# Patient Record
Sex: FEMALE | Race: WHITE
[De-identification: names, ages, dates, MRNs, and addresses within clinical notes are randomized per-mention and may not be internally consistent; named-entity substitution may affect disease eponyms.]

---
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
## 2017-07-21 ENCOUNTER — HOSPITAL ENCOUNTER (OUTPATIENT)
Dept: HOSPITAL 33 - SDC | Age: 37
Discharge: HOME | End: 2017-07-21
Attending: FAMILY MEDICINE
Payer: COMMERCIAL

## 2017-07-21 VITALS — DIASTOLIC BLOOD PRESSURE: 74 MMHG | OXYGEN SATURATION: 99 % | SYSTOLIC BLOOD PRESSURE: 116 MMHG

## 2017-07-21 VITALS — HEART RATE: 71 BPM

## 2017-07-21 DIAGNOSIS — K29.70: ICD-10-CM

## 2017-07-21 DIAGNOSIS — K30: Primary | ICD-10-CM

## 2017-07-21 PROCEDURE — 88305 TISSUE EXAM BY PATHOLOGIST: CPT

## 2017-07-21 PROCEDURE — 00740: CPT

## 2017-07-21 PROCEDURE — 36415 COLL VENOUS BLD VENIPUNCTURE: CPT

## 2017-07-21 PROCEDURE — 0DB78ZX EXCISION OF STOMACH, PYLORUS, VIA NATURAL OR ARTIFICIAL OPENING ENDOSCOPIC, DIAGNOSTIC: ICD-10-PCS | Performed by: FAMILY MEDICINE

## 2017-07-21 NOTE — OP
SURGERY DATE/TIME:  07/21/2017  0745     



PREOPERATIVE DIAGNOSIS:    Epigastric pain.



POSTOPERATIVE DIAGNOSES:    

1) Delayed gastric emptying. 

2) Mild gastritis. 



PROCEDURE:    Esophagogastroduodenoscopy with biopsy.



SURGEON:    Dr. Olivier.



ANESTHESIA:    Medications were given by the anesthesia department. 



BRIEF HISTORY: The patient is a 37 year old white female who presents now with persistent 
epigastric pain. She reports she has been having the pain for a year and a half. She 
reports that she had a previous endoscopy which showed antral gastritis. The patient was 
felt the need to have repeat endoscopic evaluation given the persistence of the pain. The 
patient was appraised of the risks of the procedure including the risk of perforation, 
phlebitis, untoward reaction to medication, bleeding and missed lesions.  The patient 
verbalized her understanding and desired to have the procedure performed.



DESCRIPTION OF PROCEDURE:    The patient was given the medications by the anesthesia 
department.  She had continuous pulse oximetry, ECG monitoring, intermittent blood 
pressure monitoring and tidal CO2 monitoring during the examination. She was placed in the 
left lateral decubitus position.  A bite block was placed. The flexible Olympus 
gastroscope was used to intubate the oropharynx. A view of the larynx was obtained and was 
normal. The scope was easily passed in the esophagus which appeared to be normal 
throughout its length. The stomach was entered. There was noted to be fairly large amounts 
of retained gastric food stuff but we were able to examine the stomach however with the 
exception the part underlying the gastric food stuffs. The scope was passed along the 
greater curvature of the stomach to the antrum. The pylorus encountered and intubated. 
Duodenum inspected and found to be normal. The scope is withdrawn towards the stomach. 
Again, a retroflex view was obtained and showed normal appearing cardia region and fundus 
of the stomach. The scope was then redirected towards the gastric antrum and biopsies were 
obtained to rule out the presence of Helicobacter pylori-type organisms. The scope was 
then removed from the patient who tolerated the procedure well and was sent back to 
outpatient recovery in good condition.

## 2018-05-26 ENCOUNTER — HOSPITAL ENCOUNTER (EMERGENCY)
Dept: HOSPITAL 33 - ED | Age: 38
Discharge: ANOTHER HOSPITAL | End: 2018-05-26
Payer: COMMERCIAL

## 2018-05-26 VITALS — OXYGEN SATURATION: 98 %

## 2018-05-26 VITALS — HEART RATE: 51 BPM | SYSTOLIC BLOOD PRESSURE: 129 MMHG | DIASTOLIC BLOOD PRESSURE: 70 MMHG

## 2018-05-26 DIAGNOSIS — R19.7: ICD-10-CM

## 2018-05-26 DIAGNOSIS — R10.12: ICD-10-CM

## 2018-05-26 DIAGNOSIS — R11.2: ICD-10-CM

## 2018-05-26 DIAGNOSIS — R94.5: Primary | ICD-10-CM

## 2018-05-26 DIAGNOSIS — Z79.899: ICD-10-CM

## 2018-05-26 LAB
ALBUMIN SERPL-MCNC: 4.4 G/DL (ref 3.5–5)
ALP SERPL-CCNC: 194 U/L (ref 38–126)
ALT SERPL-CCNC: 235 U/L (ref 0–35)
AMPHETAMINES UR QL: NEGATIVE
AMYLASE SERPL-CCNC: 54 U/L (ref 30–110)
ANION GAP SERPL CALC-SCNC: 15.2 MEQ/L (ref 5–15)
AST SERPL QL: 168 U/L (ref 14–36)
BARBITURATES UR QL: NEGATIVE
BENZODIAZ UR QL SCN: POSITIVE
BILIRUB BLD-MCNC: 2.4 MG/DL (ref 0.2–1.3)
BUN SERPL-MCNC: 4 MG/DL (ref 7–17)
CALCIUM SPEC-MCNC: 9.3 MG/DL (ref 8.4–10.2)
CELLS COUNTED: 100
CHLORIDE SERPL-SCNC: 106 MMOL/L (ref 98–107)
CO2 SERPL-SCNC: 20 MMOL/L (ref 22–30)
COCAINE UR QL SCN: NEGATIVE
CREAT SERPL-MCNC: 0.53 MG/DL (ref 0.52–1.04)
GLUCOSE SERPL-MCNC: 100 MG/DL (ref 74–106)
GLUCOSE UR-MCNC: NEGATIVE MG/DL
GRANULOCYTES # BLD AUTO: 4.15 10*3/UL (ref 1.4–6.9)
HCT VFR BLD AUTO: 46.6 % (ref 35–47)
HGB BLD-MCNC: 16.3 GM/DL (ref 12–16)
LIPASE SERPL-CCNC: 62 U/L (ref 23–300)
MANUAL DIF COMMENT BLD-IMP: NORMAL
MCH RBC QN AUTO: 35.7 PG (ref 26–32)
MCHC RBC AUTO-ENTMCNC: 35 G/DL (ref 32–36)
METHADONE UR QL: NEGATIVE
OPIATES UR QL: POSITIVE
PCP UR QL CFM>20 NG/ML: NEGATIVE
PLATELET # BLD AUTO: 149 K/MM3 (ref 150–450)
POTASSIUM SERPLBLD-SCNC: 3.3 MMOL/L (ref 3.5–5.1)
PROT SERPL-MCNC: 7.5 G/DL (ref 6.3–8.2)
PROT UR STRIP-MCNC: NEGATIVE MG/DL
RBC # BLD AUTO: 4.56 M/MM3 (ref 4.1–5.4)
SODIUM SERPL-SCNC: 139 MMOL/L (ref 137–145)
THC UR QL SCN: POSITIVE
WBC # BLD AUTO: 8.4 K/MM3 (ref 4–10.5)

## 2018-05-26 PROCEDURE — 99285 EMERGENCY DEPT VISIT HI MDM: CPT

## 2018-05-26 PROCEDURE — 96375 TX/PRO/DX INJ NEW DRUG ADDON: CPT

## 2018-05-26 PROCEDURE — 84703 CHORIONIC GONADOTROPIN ASSAY: CPT

## 2018-05-26 PROCEDURE — 82150 ASSAY OF AMYLASE: CPT

## 2018-05-26 PROCEDURE — 81002 URINALYSIS NONAUTO W/O SCOPE: CPT

## 2018-05-26 PROCEDURE — 83690 ASSAY OF LIPASE: CPT

## 2018-05-26 PROCEDURE — 74176 CT ABD & PELVIS W/O CONTRAST: CPT

## 2018-05-26 PROCEDURE — 80307 DRUG TEST PRSMV CHEM ANLYZR: CPT

## 2018-05-26 PROCEDURE — 85025 COMPLETE CBC W/AUTO DIFF WBC: CPT

## 2018-05-26 PROCEDURE — 96361 HYDRATE IV INFUSION ADD-ON: CPT

## 2018-05-26 PROCEDURE — 36000 PLACE NEEDLE IN VEIN: CPT

## 2018-05-26 PROCEDURE — 36415 COLL VENOUS BLD VENIPUNCTURE: CPT

## 2018-05-26 PROCEDURE — 96360 HYDRATION IV INFUSION INIT: CPT

## 2018-05-26 PROCEDURE — 80053 COMPREHEN METABOLIC PANEL: CPT

## 2018-05-26 PROCEDURE — 96374 THER/PROPH/DIAG INJ IV PUSH: CPT

## 2018-05-26 NOTE — XRAY
Indication: Lower abdominal pain.  Nausea, vomiting, diarrhea.



Multiple contiguous axial images obtained through the abdomen and pelvis

without contrast as ordered.



Comparison: January 30, 2013.



Lung bases again demonstrates minimal right middle lobe fibrosis/scarring.  No

infiltrate or effusion.  Heart is not enlarged.



Noncontrasted stomach and bowel loops appear nonobstructed.  Normal appendix.

Minimal sigmoid diverticulosis without diverticulitis.  No free fluid/air.

Again diffuse fatty liver, cholecystectomy, and hysterectomy.



Remaining liver, pancreas, spleen, adrenal glands, kidneys, ureters, bladder,

and aorta appear unremarkable for noncontrast exam.



Osseous structures intact.



Impression:

1.  Again fatty liver and sigmoid diverticulosis.

2.  No new or acute intra-abdominal/pelvic abnormalities on this noncontrast

exam.



Comment: Preliminary interpretation was made by [facility].  No critical discrepancy.



CTDI 18.18

## 2018-05-26 NOTE — ERPHSYRPT
- History of Present Illness


Time Seen by Provider: 05/26/18 00:23


Historian: patient


Exam Limitations: no limitations


Patient Subjective Stated Complaint: n/v/d since tuesday.  low abdominal pain 

that radiates to back and upper left abdomen.  has a hx of pancreatitis.  

states has hx of stomache problems.  ramos not followed up with Dr Ferrell.


Triage Nursing Assessment: alert and uncomfortable. states n/v/d since tuesday.

  states pain is lower abdomen that goes to back and upper left abdomen.  

states unable to eat since tuedsay and had water earlier.  manisha has had 

difficulty with urinating but denies dysuria.  + BS x4.  color pale.  states 

her diarrhea has been liquid.  was scheduled for colonoscopy but has not 

followed up.  manisha has a hx of pancreatitis.


Physician History: 





Pt started c/o LUQ abdominal pain, nausea, vomiting, and diarrhea 3 days ago. 

She has vomited x5 today, denies vomiting blood, and bloody, black diarrhea, no 

fever, chills, urinary complaints. 


Timing/Duration: day(s) (3)


Activities at Onset: none


Quality: cramping


Abdominal Pain Onset Location: LUQ


Pain Radiation: flank (left)


Severity of Pain-Max: severe


Severity of Pain-Current: severe


Modifying Factors: Improves With: nothing


Associated Symptoms: diarrhea, nausea, vomiting


Previous symptoms: same symptoms as today


Allergies/Adverse Reactions: 








Iodinated Contrast- Oral and IV Dye [IV Dye, Iodine Containing Contrast ] 

Allergy (Intermediate, Verified 07/21/17 07:25)


 Swelling





Home Medications: 








Melatonin/Pyridoxine HCl (B6) [Melatonin 1 mg Tablet] 1 mg PO DAILY 10/09/16 [

History]


Albuterol Sulfate [Ventolin Hfa] 8 gm IH Q4-6HPRN PRN 07/20/17 [History]


Amitriptyline HCl 100 mg PO HS 07/20/17 [History]





Hx Tetanus, Diphtheria Vaccination/Date Given: Yes


Hx Influenza Vaccination/Date Given: No


Hx Pneumococcal Vaccination/Date Given: No


Immunizations Up to Date:  (unknown)





- Review of Systems


Constitutional: No Symptoms


Abdominal/Gastrointestinal: Abdominal Pain, Nausea, Vomiting, Diarrhea


Genitourinary Symptoms: Flank Pain


All Other Systems: Reviewed and Negative





- Past Medical History


Pertinent Past Medical History: Yes


Neurological History: Migraines


ENT History: No Pertinent History


Cardiac History: No Pertinent History


Respiratory History: No Pertinent History


Endocrine Medical History: No Pertinent History


Musculoskeletal History: Osteoarthritis


GI Medical History: Gallbladder Disease


 History: No Pertinent History


Psycho-Social History: Depression


Female Reproductive Disorders: Uterine Cancer


Other Medical History: BACK PROBLEMS





- Past Surgical History


Past Surgical History: Yes


Neuro Surgical History: No Pertinent History


Cardiac: No Pertinent History


Respiratory: No Pertinent History


Gastrointestinal: Cholecystectomy


Musculoskeletal: Orthopedic Surgery


Female Surgical History: Hysterectomy


Other Surgical History: neck surg





- Social History


Smoking Status: Current every day smoker


How long have you smoked: 25


Exposure to second hand smoke: Yes


Drug Use: marijuana


Patient Lives Alone: No





- Female History


Hx Pregnant Now: No





- Nursing Vital Signs


Nursing Vital Signs: 


 Initial Vital Signs











Temperature  98.1 F   05/26/18 00:13


 


Pulse Rate  56 L  05/26/18 00:13


 


Respiratory Rate  20   05/26/18 00:13


 


Blood Pressure  127/89   05/26/18 00:13


 


O2 Sat by Pulse Oximetry  98   05/26/18 00:13








 Pain Scale











Pain Intensity                 9

















- Physical Exam


General Appearance: no apparent distress


Eye Exam: eyes nml inspection


Ears, Nose, Throat Exam: normal ENT inspection


Neck Exam: normal inspection, non-tender, supple


Respiratory Exam: normal breath sounds, lungs clear, airway intact, No chest 

tenderness


Cardiovascular Exam: regular rate/rhythm, normal heart sounds, normal 

peripheral pulses, No murmur


Gastrointestinal/Abdomen Exam: soft, normal bowel sounds, tenderness (LUQ, mod.)

, No distention, No mass, No guarding, No pulsatile mass, No rebound, No 

organomegaly


Pelvic Exam: not done


Back Exam: normal inspection, CVA tenderness (bilateral)


Extremity Exam: normal inspection


Neurologic Exam: alert, oriented x 3, normal mood/affect


Skin Exam: normal color, warm, dry, No rash


Lymphatic Exam: No adenopathy


**SpO2 Interpretation**: normal


SpO2: 98


Oxygen Delivery: Room Air





- Course


Nursing assessment & vital signs reviewed: Yes





- CT Exams


  ** Abdomen/Pelvis


CT Interpretation: Negative, Tele-radiologist Report


Ordered Tests: 


 Active Orders 24 hr











 Category Date Time Status


 


 IV Insertion STAT Care  05/26/18 00:46 Active


 


 ABDOMEN AND PELVIS W/0 CONTRAS [CT] Stat Exams  05/26/18 00:46 Taken


 


 AMYLASE Stat Lab  05/26/18 00:50 Completed


 


 CBC W DIFF Stat Lab  05/26/18 00:50 Completed


 


 CMP Stat Lab  05/26/18 00:50 Completed


 


 HCG,QUALITATIVE URINE Stat Lab  05/26/18 00:50 Completed


 


 LIPASE Stat Lab  05/26/18 00:50 Completed


 


 Manual Differential NC Stat Lab  05/26/18 00:50 Completed


 


 UA W/RFX UR CULTURE Stat Lab  05/26/18 00:50 Completed


 


 Urine Triage Profile Stat Lab  05/26/18 00:50 Completed








Medication Summary














Discontinued Medications














Generic Name Dose Route Start Last Admin





  Trade Name Monica  PRN Reason Stop Dose Admin


 


Famotidine  20 mg  05/26/18 00:46  05/26/18 01:02





  Pepcid 20 Mg Vial***  IV  05/26/18 00:47  20 mg





  STAT ONE   Administration


 


Famotidine  Confirm  05/26/18 00:53  





  Pepcid 20 Mg Vial***  Administered  05/26/18 00:54  





  Dose   





  20 mg   





  IV   





  .STK-MED ONE   


 


Fentanyl Citrate  75 mcg  05/26/18 03:30  





  Sublimaze 100 Mcg/2 Ml***  IV  05/26/18 03:31  





  STAT ONE   


 


Sodium Chloride  1,000 mls @ 999 mls/hr  05/26/18 00:46  05/26/18 01:02





  Sodium Chloride 0.9% 1000 Ml  IV  05/26/18 01:46  999 mls/hr





  .Q1H1M STA   Administration


 


Sodium Chloride  Confirm  05/26/18 00:54  





  Sodium Chloride 0.9% 1000 Ml  Administered  05/26/18 00:55  





  Dose   





  1,000 mls @ ud   





  .ROUTE   





  .STK-MED ONE   


 


Ketorolac Tromethamine  30 mg  05/26/18 00:46  05/26/18 01:02





  Toradol 30 Mg Injection***  IV  05/26/18 00:47  30 mg





  STAT ONE   Administration


 


Ketorolac Tromethamine  Confirm  05/26/18 00:53  





  Toradol 30 Mg Injection***  Administered  05/26/18 00:54  





  Dose   





  30 mg   





  .ROUTE   





  .STK-MED ONE   


 


Ondansetron HCl  4 mg  05/26/18 00:46  05/26/18 01:02





  Zofran 4 Mg/2 Ml Vial**  IV  05/26/18 00:47  4 mg





  STAT ONE   Administration


 


Ondansetron HCl  Confirm  05/26/18 00:53  





  Zofran 4 Mg/2 Ml Vial**  Administered  05/26/18 00:54  





  Dose   





  4 mg   





  .ROUTE   





  .STK-MED ONE   











Lab/Rad Data: 


 Laboratory Result Diagrams





 05/26/18 00:50 





 05/26/18 00:50 





 Laboratory Results











  05/26/18 05/26/18 05/26/18 Range/Units





  00:50 00:50 00:50 


 


WBC     (4.0-10.5)  K/mm3


 


RBC     (4.1-5.4)  M/mm3


 


Hgb     (12.0-16.0)  gm/dl


 


Hct     (35-47)  %


 


MCV     ()  fl


 


MCH     (26-32)  pg


 


MCHC     (32-36)  g/dl


 


RDW     (11.5-14.0)  %


 


Plt Count     (150-450)  K/mm3


 


MPV     (6-9.5)  fl


 


Absolute Granulocytes     (1.4-6.9)  


 


Segmented Neutrophils     (36.0-66.0)  %


 


Lymphocytes (Manual)     (24-44)  %


 


Monocytes (Manual)     (0.0-12.0)  %


 


Eosinophils (Manual)     (0.00-3.0)  %


 


Basophils (Manual)     (0.0-1.0)  %


 


Platelet Estimate     (NORMAL)  


 


RBC Morphology     


 


Sodium     (137-145)  mmol/L


 


Potassium     (3.5-5.1)  mmol/L


 


Chloride     ()  mmol/L


 


Carbon Dioxide     (22-30)  mmol/L


 


Anion Gap     (5-15)  MEQ/L


 


BUN     (7-17)  mg/dL


 


Creatinine     (0.52-1.04)  mg/dL


 


Estimated GFR     ML/MIN


 


Glucose     ()  mg/dL


 


Calcium     (8.4-10.2)  mg/dL


 


Total Bilirubin     (0.2-1.3)  mg/dL


 


AST     (14-36)  U/L


 


ALT     (0-35)  U/L


 


Alkaline Phosphatase     ()  U/L


 


Serum Total Protein     (6.3-8.2)  g/dL


 


Albumin     (3.5-5.0)  g/dL


 


Amylase     ()  U/L


 


Lipase     ()  U/L


 


Ur Collection Type    CLEAN CATCH  


 


Urine Color    GABY  (YELLOW)  


 


Urine Appearance    CLEAR  (CLEAR)  


 


Urine pH    6.0  (5-6)  


 


Ur Specific Gravity    1.015  (1.005-1.025)  


 


Urine Protein    NEGATIVE  (Negative)  


 


Urine Ketones    MODERATE  (NEGATIVE)  


 


Urine Blood    NEGATIVE  (0-5)  Oscar/ul


 


Urine Nitrite    NEGATIVE  (NEGATIVE)  


 


Urine Bilirubin    NEGATIVE  (NEGATIVE)  


 


Urine Urobilinogen    NORMAL  (0-1)  mg/dL


 


Ur Leukocyte Esterase    NEGATIVE  (NEGATIVE)  


 


Urine Culture Reflexed    NO  (NO)  


 


Urine Glucose    NEGATIVE  (NEGATIVE)  mg/dL


 


Urine HCG, Qual  NEGATIVE    (Negative)  


 


Urine Opiates Level   POSITIVE   (NEGATIVE)  


 


Ur Methadone   NEGATIVE   (NEGATIVE)  


 


Urine Barbiturates   NEGATIVE   (NEGATIVE)  


 


Ur Phencyclidine (PCP)   NEGATIVE   (NEGATIVE)  


 


Urine Amphetamine   NEGATIVE   (NEGATIVE)  


 


U Benzodiazepine Level   POSITIVE   (NEGATIVE)  


 


Urine Cocaine   NEGATIVE   (NEGATIVE)  


 


Urine Marijuana (THC)   POSITIVE   (NEGATIVE)  


 


Specimen Received    05/26/18 0050  














  05/26/18 05/26/18 Range/Units





  00:50 00:50 


 


WBC   8.4  (4.0-10.5)  K/mm3


 


RBC   4.56  (4.1-5.4)  M/mm3


 


Hgb   16.3 H  (12.0-16.0)  gm/dl


 


Hct   46.6  (35-47)  %


 


MCV   102.2 H  ()  fl


 


MCH   35.7 H  (26-32)  pg


 


MCHC   35.0  (32-36)  g/dl


 


RDW   13.0  (11.5-14.0)  %


 


Plt Count   149 L  (150-450)  K/mm3


 


MPV   12.0 H  (6-9.5)  fl


 


Absolute Granulocytes   4.15  (1.4-6.9)  


 


Segmented Neutrophils   52  (36.0-66.0)  %


 


Lymphocytes (Manual)   31  (24-44)  %


 


Monocytes (Manual)   13 H  (0.0-12.0)  %


 


Eosinophils (Manual)   3  (0.00-3.0)  %


 


Basophils (Manual)   1  (0.0-1.0)  %


 


Platelet Estimate   NORMAL  (NORMAL)  


 


RBC Morphology   NORMAL  


 


Sodium  139   (137-145)  mmol/L


 


Potassium  3.3 L   (3.5-5.1)  mmol/L


 


Chloride  106   ()  mmol/L


 


Carbon Dioxide  20 L   (22-30)  mmol/L


 


Anion Gap  15.2 H   (5-15)  MEQ/L


 


BUN  4 L   (7-17)  mg/dL


 


Creatinine  0.53   (0.52-1.04)  mg/dL


 


Estimated GFR  > 60.0   ML/MIN


 


Glucose  100   ()  mg/dL


 


Calcium  9.3   (8.4-10.2)  mg/dL


 


Total Bilirubin  2.40 H   (0.2-1.3)  mg/dL


 


AST  168 H   (14-36)  U/L


 


ALT  235 H   (0-35)  U/L


 


Alkaline Phosphatase  194 H   ()  U/L


 


Serum Total Protein  7.5   (6.3-8.2)  g/dL


 


Albumin  4.4   (3.5-5.0)  g/dL


 


Amylase  54   ()  U/L


 


Lipase  62   ()  U/L


 


Ur Collection Type    


 


Urine Color    (YELLOW)  


 


Urine Appearance    (CLEAR)  


 


Urine pH    (5-6)  


 


Ur Specific Gravity    (1.005-1.025)  


 


Urine Protein    (Negative)  


 


Urine Ketones    (NEGATIVE)  


 


Urine Blood    (0-5)  Oscar/ul


 


Urine Nitrite    (NEGATIVE)  


 


Urine Bilirubin    (NEGATIVE)  


 


Urine Urobilinogen    (0-1)  mg/dL


 


Ur Leukocyte Esterase    (NEGATIVE)  


 


Urine Culture Reflexed    (NO)  


 


Urine Glucose    (NEGATIVE)  mg/dL


 


Urine HCG, Qual    (Negative)  


 


Urine Opiates Level    (NEGATIVE)  


 


Ur Methadone    (NEGATIVE)  


 


Urine Barbiturates    (NEGATIVE)  


 


Ur Phencyclidine (PCP)    (NEGATIVE)  


 


Urine Amphetamine    (NEGATIVE)  


 


U Benzodiazepine Level    (NEGATIVE)  


 


Urine Cocaine    (NEGATIVE)  


 


Urine Marijuana (THC)    (NEGATIVE)  


 


Specimen Received    














- Progress


Progress: improved


Progress Note: 





05/26/18 03:20


Pt improved after iv saline, Zofran and Pepcid, did not vomit, afebrile, 

stable.We called Dr Ferrell, Gastroenterologist, he is not available. We called 

[facility], case was discussed with Dr Tabares, Gastroenterologist, he 

accepted patient to be seen there. I called Dr Limon, Hospitalist, discussed 

her results and current condition, she accepted patient to be admitted for 

further care to [facility]. Patient was informed about all risks and 

benefits involved in her transfer, she agreed.


Counseled pt/family regarding: lab results, diagnosis, rad results





- Departure


Time of Disposition: 03:22


Departure Disposition: Transfer ([facility], [facility])


Clinical Impression: 


 Elevated LFTs





Vomiting


Qualifiers:


 Vomiting type: unspecified Vomiting Intractability: non-intractable Nausea 

presence: with nausea Qualified Code(s): R11.2 - Nausea with vomiting, 

unspecified





Condition: Stable


Critical Care Time: No


Referrals: 


ELIO VELEZ [Primary Care Provider] -
